# Patient Record
Sex: MALE | Race: BLACK OR AFRICAN AMERICAN | Employment: UNEMPLOYED | URBAN - METROPOLITAN AREA
[De-identification: names, ages, dates, MRNs, and addresses within clinical notes are randomized per-mention and may not be internally consistent; named-entity substitution may affect disease eponyms.]

---

## 2023-06-01 ENCOUNTER — HOSPITAL ENCOUNTER (EMERGENCY)
Age: 18
Discharge: HOME | End: 2023-06-01
Attending: STUDENT IN AN ORGANIZED HEALTH CARE EDUCATION/TRAINING PROGRAM
Payer: COMMERCIAL

## 2023-06-01 VITALS
OXYGEN SATURATION: 99 % | DIASTOLIC BLOOD PRESSURE: 86 MMHG | WEIGHT: 135 LBS | HEIGHT: 72 IN | RESPIRATION RATE: 16 BRPM | BODY MASS INDEX: 18.28 KG/M2 | TEMPERATURE: 98 F | HEART RATE: 57 BPM | SYSTOLIC BLOOD PRESSURE: 97 MMHG

## 2023-06-01 DIAGNOSIS — L02.91 ABSCESS: Primary | ICD-10-CM

## 2023-06-01 PROCEDURE — 2500000003 HC RX 250 WO HCPCS: Performed by: STUDENT IN AN ORGANIZED HEALTH CARE EDUCATION/TRAINING PROGRAM

## 2023-06-01 PROCEDURE — 99283 EMERGENCY DEPT VISIT LOW MDM: CPT

## 2023-06-01 PROCEDURE — 10060 I&D ABSCESS SIMPLE/SINGLE: CPT

## 2023-06-01 RX ORDER — SULFAMETHOXAZOLE AND TRIMETHOPRIM 800; 160 MG/1; MG/1
1 TABLET ORAL 2 TIMES DAILY
Qty: 14 TABLET | Refills: 0 | Status: SHIPPED | OUTPATIENT
Start: 2023-06-01 | End: 2023-06-08

## 2023-06-01 RX ADMIN — LIDOCAINE HYDROCHLORIDE 10 ML: 10; .005 INJECTION, SOLUTION EPIDURAL; INFILTRATION; INTRACAUDAL; PERINEURAL at 04:04

## 2023-06-01 ASSESSMENT — LIFESTYLE VARIABLES: HOW OFTEN DO YOU HAVE A DRINK CONTAINING ALCOHOL: NEVER

## 2023-06-01 ASSESSMENT — PAIN - FUNCTIONAL ASSESSMENT: PAIN_FUNCTIONAL_ASSESSMENT: 0-10

## 2023-06-01 ASSESSMENT — PAIN SCALES - GENERAL
PAINLEVEL_OUTOF10: 8
PAINLEVEL_OUTOF10: 8

## 2023-06-01 NOTE — ED NOTES
I have reviewed discharge instructions with the patient. The patient verbalized understanding. Patient left ED via Discharge Method: ambulatory to Home with self. Opportunity for questions and clarification provided. Patient given 1 script. To continue your aftercare when you leave the hospital, you may receive an automated call from our care team to check in on how you are doing. This is a free service and part of our promise to provide the best care and service to meet your aftercare needs.  If you have questions, or wish to unsubscribe from this service please call [phone]. Thank you for Choosing our [address] [place] [address] Emergency Department.        Eugene Easton RN  06/01/23 [id number]

## 2023-06-01 NOTE — DISCHARGE INSTRUCTIONS
Take the antibiotic as directed to run a medication. Change gauze carefully at your next bath or shower as instructed. Return 48 hours for wound check and packing removal.  Return sooner for worsening symptoms, concerns or questions.

## 2023-06-01 NOTE — ED PROVIDER NOTES
Emergency Department Provider Note       PCP: Shonda Mcwilliams MD   Age: 19 y.o. Sex: male     DISPOSITION Decision To Discharge 06/01/2023 04:12:42 AM       ICD-10-CM    1. Abscess  L02.91           Medical Decision Making     Complexity of Problems Addressed:  1 or more acute illnesses that pose a threat to life or bodily function. Data Reviewed and Analyzed:  Category 1:   I independently ordered and reviewed each unique test.     The patients assessment required an independent historian: Mom at bedside. The reason they were needed is important historical information not provided by the patient. Category 2:       Category 3: Discussion of management or test interpretation. 38-year-old otherwise healthy male patient presenting to this department with evidence of abscess over the distal portion of the medial left buttocks. This does not include the rectum. Area was anesthetized with lidocaine with epi, I&D performed and packing placed. Patient tolerated well. We will place on short course of antibiotics and give instructions for return visit in 48 hours for wound check and packing removal.      Risk of Complications and/or Morbidity of Patient Management:  Prescription drug management performed. Shared medical decision making was utilized in creating the patients health plan today. History      Lynn Gonzalez is a 19 y.o. male who presents to the Emergency Department with chief complaint of    Chief Complaint   Patient presents with    Abscess     Back of left leg      38-year-old male patient presenting this department with reports of painful swollen area at the medial aspect of the left buttocks. He states its been present for approximately 1 week. He reports some mild drainage from the area earlier today and states pain worsened prompting his visit to this department. He arrives with his mother who is visualize the area and is concerned for infection.   Patient has no significant past

## 2023-06-22 ENCOUNTER — OFFICE VISIT (OUTPATIENT)
Dept: SURGERY | Age: 18
End: 2023-06-22
Payer: COMMERCIAL

## 2023-06-22 VITALS
OXYGEN SATURATION: 98 % | BODY MASS INDEX: 18.28 KG/M2 | HEART RATE: 84 BPM | HEIGHT: 72 IN | SYSTOLIC BLOOD PRESSURE: 102 MMHG | DIASTOLIC BLOOD PRESSURE: 70 MMHG | WEIGHT: 135 LBS

## 2023-06-22 DIAGNOSIS — K61.1 PERIRECTAL ABSCESS: ICD-10-CM

## 2023-06-22 PROCEDURE — 99204 OFFICE O/P NEW MOD 45 MIN: CPT | Performed by: STUDENT IN AN ORGANIZED HEALTH CARE EDUCATION/TRAINING PROGRAM

## 2023-06-22 RX ORDER — CHLORHEXIDINE GLUCONATE 4 G/100ML
SOLUTION TOPICAL
Qty: 236 ML | Refills: 2 | Status: SHIPPED | OUTPATIENT
Start: 2023-06-22

## 2023-06-22 RX ORDER — CLINDAMYCIN PHOSPHATE 10 MG/G
GEL TOPICAL
Qty: 60 G | Refills: 2 | Status: SHIPPED | OUTPATIENT
Start: 2023-06-22

## 2023-06-22 NOTE — PROGRESS NOTES
tenderness, deformity or swelling. Full ROM UE/LE. Distal pulses intact UE/LE. No edema, cyanosis, or venous stasis changes. Skin Normal coloration and turgor, no rashes, no suspicious skin lesions noted  Neurological alert, oriented, normal speech, no focal findings or movement disorder noted, CN II-XII intact  Psychiatric Alert and oriented, appropriate affect      STUDIES: None    IMPRESSION:  Perirectal abscess    PLAN:  Patient presents with a healed perirectal abscess. He has 3 areas of concern that have turned into chronic capsules. None of them appear to be overtly infected at this point in time. I discussed with him that these areas need no further treatment at this point in time. They may ultimately become chronic cavities that we could ultimately excised. I do not think it is necessary at this point in time and I think ultimately would be better to wait until after the summer months for better wound healing in this particular area. I am to send him home with Hibiclens to keep this area nice and clean as well as topical clindamycin ointment to utilize when he feels these areas are starting to pop up. He can come and see me as needed.

## 2024-07-14 ENCOUNTER — HOSPITAL ENCOUNTER (EMERGENCY)
Age: 19
Discharge: HOME | End: 2024-07-14
Payer: COMMERCIAL

## 2024-07-14 ENCOUNTER — APPOINTMENT (OUTPATIENT)
Dept: GENERAL RADIOLOGY | Age: 19
End: 2024-07-14
Payer: COMMERCIAL

## 2024-07-14 VITALS
TEMPERATURE: 98.6 F | HEIGHT: 71 IN | WEIGHT: 135 LBS | DIASTOLIC BLOOD PRESSURE: 79 MMHG | RESPIRATION RATE: 19 BRPM | SYSTOLIC BLOOD PRESSURE: 124 MMHG | BODY MASS INDEX: 18.9 KG/M2 | HEART RATE: 71 BPM | OXYGEN SATURATION: 98 %

## 2024-07-14 DIAGNOSIS — S93.492A SPRAIN OF ANTERIOR TALOFIBULAR LIGAMENT OF LEFT ANKLE, INITIAL ENCOUNTER: Primary | ICD-10-CM

## 2024-07-14 PROCEDURE — 73610 X-RAY EXAM OF ANKLE: CPT

## 2024-07-14 PROCEDURE — 6370000000 HC RX 637 (ALT 250 FOR IP): Performed by: STUDENT IN AN ORGANIZED HEALTH CARE EDUCATION/TRAINING PROGRAM

## 2024-07-14 PROCEDURE — 99283 EMERGENCY DEPT VISIT LOW MDM: CPT

## 2024-07-14 RX ORDER — ACETAMINOPHEN 500 MG
1000 TABLET ORAL
Status: COMPLETED | OUTPATIENT
Start: 2024-07-14 | End: 2024-07-14

## 2024-07-14 RX ORDER — MELOXICAM 15 MG/1
15 TABLET ORAL DAILY
Qty: 30 TABLET | Refills: 0 | Status: SHIPPED | OUTPATIENT
Start: 2024-07-14 | End: 2024-08-13

## 2024-07-14 RX ADMIN — ACETAMINOPHEN 1000 MG: 500 TABLET ORAL at 21:26

## 2024-07-14 ASSESSMENT — LIFESTYLE VARIABLES
HOW MANY STANDARD DRINKS CONTAINING ALCOHOL DO YOU HAVE ON A TYPICAL DAY: PATIENT DOES NOT DRINK
HOW OFTEN DO YOU HAVE A DRINK CONTAINING ALCOHOL: NEVER

## 2024-07-14 ASSESSMENT — PAIN DESCRIPTION - ORIENTATION: ORIENTATION: LEFT

## 2024-07-14 ASSESSMENT — PAIN SCALES - GENERAL
PAINLEVEL_OUTOF10: 4
PAINLEVEL_OUTOF10: 3
PAINLEVEL_OUTOF10: 3
PAINLEVEL_OUTOF10: 1

## 2024-07-14 ASSESSMENT — PAIN DESCRIPTION - LOCATION: LOCATION: ANKLE

## 2024-07-14 ASSESSMENT — PAIN - FUNCTIONAL ASSESSMENT: PAIN_FUNCTIONAL_ASSESSMENT: 0-10

## 2024-07-14 ASSESSMENT — PAIN DESCRIPTION - DESCRIPTORS: DESCRIPTORS: DISCOMFORT

## 2024-07-15 NOTE — ED PROVIDER NOTES
VIEWS)    [facility]    [facility] [place]    [employer] Ankle Brace, Left    [facility] Crutches; Pair, Left Side Injury; Tall (5'10\"-6'6\")        Medications given during this emergency department visit:  Medications   acetaminophen (TYLENOL) tablet 1,000 mg (1,000 mg Oral Given 7/14/24 2126)       New Prescriptions    MELOXICAM (MOBIC) 15 MG TABLET    Take 1 tablet by mouth daily        History reviewed. No pertinent past medical history.     History reviewed. No pertinent surgical history.     Social History     Socioeconomic History    Marital status: Single     Spouse name: None    Number of children: None    Years of education: None    Highest education level: None   Tobacco Use    Smoking status: Never    Smokeless tobacco: Never   Substance and Sexual Activity    Alcohol use: Never    Drug use: Yes     Types: Marijuana (Weed)        Previous Medications    CHLORHEXIDINE (HIBICLENS) 4 % EXTERNAL LIQUID    Apply topically daily as needed.    CLINDAMYCIN (CLEOCIN-T) 1 % GEL    Apply topically 2 times daily.        Results for orders placed or performed during the hospital encounter of 07/14/24   XR ANKLE LEFT (MIN 3 VIEWS)    Narrative    EXAM: PLAIN FILM OF ANKLE LEFT    HISTORY:  Pain    TECHNIQUE:  3views of the ankle.    COMPARISON:  Contralateral right ankle 22 January 2010 right ankle    FINDINGS:  There is no evidence for acute fracture.    Bone mineralization is within normal limits.  Joint spaces are maintained.    Soft tissues are edematous.    There is no radiopaque foreign body.              Impression    Soft tissue swelling. Please correlate for ankle sprain.      Electronically signed by Laurie Rosas         XR ANKLE LEFT (MIN 3 VIEWS)   Final Result   Soft tissue swelling. Please correlate for ankle sprain.         Electronically signed by Laurie Rosas                   No results for input(s): \"COVID19\"

## 2024-07-15 NOTE — ED TRIAGE NOTES
Pt arrived via wheelchair to triage. Pt c/o L ankle pain after he was playing basketball and came down wrong on it feel a pop. Pt noted to have edema to L ankle. Pt in NAD during triage.

## 2024-07-15 NOTE — ED NOTES
Patient mobility status  with no difficulty. Provider aware     I have reviewed discharge instructions with the patient.  The patient verbalized understanding.    Patient left ED via Discharge Method: ambulatory with crutches   to Home with Parent.    Opportunity for questions and clarification provided.     Patient given 1 scripts.

## 2024-07-15 NOTE — DISCHARGE INSTRUCTIONS
Your x-ray did not show signs of fracture.  Use the brace and crutches to help ambulate.  Use Mobic for pain.  Follow-up with orthopedics.  Return for new or worsening symptoms    As we discussed, I did not find a life threatening cause of your symptoms today. However, THAT DOES NOT MEAN IT COULD NOT DEVELOP. If you develop ANY new or worsening symptoms, it is critical that you return for re-evaluation. This includes any symptoms that are concerning to you, especially symptoms such as fevers, severe pain.  If you do not return for re-evaluation, you risk serious complications, including death.

## 2024-07-15 NOTE — ED NOTES
Patient is resting in stretcher. Respirations are even and unlabored.  Area Clear of clutter. Family at bedside. LLE elevated on towels and ice pack applied to ankle.

## 2024-07-19 ENCOUNTER — TELEPHONE (OUTPATIENT)
Dept: ORTHOPEDIC SURGERY | Age: 19
End: 2024-07-19

## 2024-07-19 NOTE — TELEPHONE ENCOUNTER
Pt missed his apt today with  when I called to r/s pt stated he can only come in after pm . We don't have any 3 pm apt until Aug 9. I have sent  a staff massage to [place] to see if  or  can see prior to Aug 8th since  will be out next week.

## 2024-07-30 ENCOUNTER — TELEPHONE (OUTPATIENT)
Dept: ORTHOPEDIC SURGERY | Age: 19
End: 2024-07-30

## 2024-08-05 ENCOUNTER — TELEPHONE (OUTPATIENT)
Dept: ORTHOPEDIC SURGERY | Age: 19
End: 2024-08-05